# Patient Record
Sex: MALE | Race: WHITE | NOT HISPANIC OR LATINO | Employment: PART TIME | URBAN - METROPOLITAN AREA
[De-identification: names, ages, dates, MRNs, and addresses within clinical notes are randomized per-mention and may not be internally consistent; named-entity substitution may affect disease eponyms.]

---

## 2017-07-24 ENCOUNTER — ALLSCRIPTS OFFICE VISIT (OUTPATIENT)
Dept: OTHER | Facility: OTHER | Age: 52
End: 2017-07-24

## 2017-07-24 DIAGNOSIS — I10 ESSENTIAL (PRIMARY) HYPERTENSION: ICD-10-CM

## 2017-07-24 DIAGNOSIS — R10.9 ABDOMINAL PAIN: ICD-10-CM

## 2017-07-24 DIAGNOSIS — R19.7 DIARRHEA: ICD-10-CM

## 2017-07-24 DIAGNOSIS — K21.9 GASTRO-ESOPHAGEAL REFLUX DISEASE WITHOUT ESOPHAGITIS: ICD-10-CM

## 2017-07-25 ENCOUNTER — APPOINTMENT (OUTPATIENT)
Dept: LAB | Facility: CLINIC | Age: 52
End: 2017-07-25
Payer: COMMERCIAL

## 2017-07-25 DIAGNOSIS — R10.9 ABDOMINAL PAIN: ICD-10-CM

## 2017-07-25 DIAGNOSIS — R19.7 DIARRHEA: ICD-10-CM

## 2017-07-25 DIAGNOSIS — K21.9 GASTRO-ESOPHAGEAL REFLUX DISEASE WITHOUT ESOPHAGITIS: ICD-10-CM

## 2017-07-25 LAB
ALBUMIN SERPL BCP-MCNC: 3.7 G/DL (ref 3.5–5)
ALP SERPL-CCNC: 63 U/L (ref 46–116)
ALT SERPL W P-5'-P-CCNC: 25 U/L (ref 12–78)
ANION GAP SERPL CALCULATED.3IONS-SCNC: 9 MMOL/L (ref 4–13)
AST SERPL W P-5'-P-CCNC: 10 U/L (ref 5–45)
BILIRUB SERPL-MCNC: 0.36 MG/DL (ref 0.2–1)
BUN SERPL-MCNC: 18 MG/DL (ref 5–25)
CALCIUM SERPL-MCNC: 9.3 MG/DL (ref 8.3–10.1)
CHLORIDE SERPL-SCNC: 107 MMOL/L (ref 100–108)
CO2 SERPL-SCNC: 24 MMOL/L (ref 21–32)
CREAT SERPL-MCNC: 0.84 MG/DL (ref 0.6–1.3)
GFR SERPL CREATININE-BSD FRML MDRD: 103 ML/MIN/1.73SQ M
GLUCOSE SERPL-MCNC: 98 MG/DL (ref 65–140)
LIPASE SERPL-CCNC: 1223 U/L (ref 73–393)
POTASSIUM SERPL-SCNC: 3.9 MMOL/L (ref 3.5–5.3)
PROT SERPL-MCNC: 7.7 G/DL (ref 6.4–8.2)
SODIUM SERPL-SCNC: 140 MMOL/L (ref 136–145)

## 2017-07-25 PROCEDURE — 83690 ASSAY OF LIPASE: CPT

## 2017-07-25 PROCEDURE — 87209 SMEAR COMPLEX STAIN: CPT

## 2017-07-25 PROCEDURE — 36415 COLL VENOUS BLD VENIPUNCTURE: CPT

## 2017-07-25 PROCEDURE — 87177 OVA AND PARASITES SMEARS: CPT

## 2017-07-25 PROCEDURE — 80053 COMPREHEN METABOLIC PANEL: CPT

## 2017-07-27 LAB — O+P STL CONC: NORMAL

## 2017-07-28 ENCOUNTER — GENERIC CONVERSION - ENCOUNTER (OUTPATIENT)
Dept: OTHER | Facility: OTHER | Age: 52
End: 2017-07-28

## 2017-07-28 ENCOUNTER — TRANSCRIBE ORDERS (OUTPATIENT)
Dept: ADMINISTRATIVE | Facility: HOSPITAL | Age: 52
End: 2017-07-28

## 2017-07-28 DIAGNOSIS — R74.8 OTHER NONSPECIFIC ABNORMAL SERUM ENZYME LEVELS: Primary | ICD-10-CM

## 2017-07-28 DIAGNOSIS — R93.5 ABNORMAL ABDOMINAL ULTRASOUND: ICD-10-CM

## 2017-08-03 ENCOUNTER — HOSPITAL ENCOUNTER (OUTPATIENT)
Dept: RADIOLOGY | Facility: HOSPITAL | Age: 52
Discharge: HOME | End: 2017-08-03
Payer: COMMERCIAL

## 2017-08-03 DIAGNOSIS — R74.8 OTHER NONSPECIFIC ABNORMAL SERUM ENZYME LEVELS: ICD-10-CM

## 2017-08-03 PROCEDURE — 74160 CT ABDOMEN W/CONTRAST: CPT

## 2017-08-03 RX ADMIN — IOHEXOL 100 ML: 350 INJECTION, SOLUTION INTRAVENOUS at 08:26

## 2017-08-05 VITALS
DIASTOLIC BLOOD PRESSURE: 100 MMHG | SYSTOLIC BLOOD PRESSURE: 172 MMHG | OXYGEN SATURATION: 95 % | RESPIRATION RATE: 18 BRPM | HEART RATE: 82 BPM | TEMPERATURE: 97 F

## 2017-08-06 ENCOUNTER — HOSPITAL ENCOUNTER (EMERGENCY)
Facility: HOSPITAL | Age: 52
Discharge: HOME | End: 2017-08-06
Attending: EMERGENCY MEDICINE
Payer: OTHER MISCELLANEOUS

## 2017-08-06 DIAGNOSIS — S60.511A ABRASION OF RIGHT HAND, INITIAL ENCOUNTER: Primary | ICD-10-CM

## 2017-08-06 PROCEDURE — 90715 TDAP VACCINE 7 YRS/> IM: CPT | Performed by: EMERGENCY MEDICINE

## 2017-08-06 PROCEDURE — 90471 IMMUNIZATION ADMIN: CPT

## 2017-08-06 PROCEDURE — 99282 EMERGENCY DEPT VISIT SF MDM: CPT

## 2017-08-06 RX ADMIN — Medication 1 APPLICATION: at 03:23

## 2017-08-06 RX ADMIN — TETANUS TOXOID, REDUCED DIPHTHERIA TOXOID AND ACELLULAR PERTUSSIS VACCINE, ADSORBED 0.5 ML: 5; 2.5; 8; 8; 2.5 SUSPENSION INTRAMUSCULAR at 03:23

## 2017-08-08 ENCOUNTER — GENERIC CONVERSION - ENCOUNTER (OUTPATIENT)
Dept: OTHER | Facility: OTHER | Age: 52
End: 2017-08-08

## 2017-08-21 ENCOUNTER — ALLSCRIPTS OFFICE VISIT (OUTPATIENT)
Dept: OTHER | Facility: OTHER | Age: 52
End: 2017-08-21

## 2017-08-26 ENCOUNTER — APPOINTMENT (OUTPATIENT)
Dept: LAB | Facility: CLINIC | Age: 52
End: 2017-08-26
Payer: COMMERCIAL

## 2017-08-26 DIAGNOSIS — I10 ESSENTIAL (PRIMARY) HYPERTENSION: ICD-10-CM

## 2017-08-26 LAB
CHOLEST SERPL-MCNC: 240 MG/DL (ref 50–200)
CREAT UR-MCNC: 107 MG/DL
HDLC SERPL-MCNC: 27 MG/DL (ref 40–60)
MICROALBUMIN UR-MCNC: 5.8 MG/L (ref 0–20)
MICROALBUMIN/CREAT 24H UR: 5 MG/G CREATININE (ref 0–30)
TRIGL SERPL-MCNC: 526 MG/DL

## 2017-08-26 PROCEDURE — 82570 ASSAY OF URINE CREATININE: CPT

## 2017-08-26 PROCEDURE — 80061 LIPID PANEL: CPT

## 2017-08-26 PROCEDURE — 36415 COLL VENOUS BLD VENIPUNCTURE: CPT

## 2017-08-26 PROCEDURE — 82043 UR ALBUMIN QUANTITATIVE: CPT

## 2017-08-27 ENCOUNTER — GENERIC CONVERSION - ENCOUNTER (OUTPATIENT)
Dept: OTHER | Facility: OTHER | Age: 52
End: 2017-08-27

## 2017-09-20 ENCOUNTER — HOSPITAL ENCOUNTER (OUTPATIENT)
Dept: RADIOLOGY | Facility: HOSPITAL | Age: 52
Discharge: HOME | End: 2017-09-20
Payer: COMMERCIAL

## 2017-09-20 DIAGNOSIS — R93.5 ABNORMAL ABDOMINAL ULTRASOUND: ICD-10-CM

## 2017-09-20 PROCEDURE — 74181 MRI ABDOMEN W/O CONTRAST: CPT

## 2017-09-25 ENCOUNTER — GENERIC CONVERSION - ENCOUNTER (OUTPATIENT)
Dept: OTHER | Facility: OTHER | Age: 52
End: 2017-09-25

## 2017-10-06 ENCOUNTER — GENERIC CONVERSION - ENCOUNTER (OUTPATIENT)
Dept: OTHER | Facility: OTHER | Age: 52
End: 2017-10-06

## 2017-10-06 DIAGNOSIS — E78.1 PURE HYPERGLYCERIDEMIA: ICD-10-CM

## 2017-10-06 DIAGNOSIS — E78.5 HYPERLIPIDEMIA: ICD-10-CM

## 2018-01-12 VITALS
WEIGHT: 193.25 LBS | RESPIRATION RATE: 18 BRPM | HEART RATE: 80 BPM | TEMPERATURE: 98.6 F | SYSTOLIC BLOOD PRESSURE: 130 MMHG | DIASTOLIC BLOOD PRESSURE: 88 MMHG | HEIGHT: 69 IN | BODY MASS INDEX: 28.62 KG/M2

## 2018-01-12 NOTE — RESULT NOTES
Verified Results  (1) COMPREHENSIVE METABOLIC PANEL [id number] 01:26HL Sameul Expose     Test Name Result Flag Reference   GLUCOSE,RANDM 98 mg/dL     If the patient is fasting, the ADA then defines impaired fasting glucose as > 100 mg/dL and diabetes as > or equal to 123 mg/dL  SODIUM 140 mmol/L  136-145   POTASSIUM 3 9 mmol/L  3 5-5 3   CHLORIDE 107 mmol/L  100-108   CARBON DIOXIDE 24 mmol/L  21-32   ANION GAP (CALC) 9 mmol/L  4-13   BLOOD UREA NITROGEN 18 mg/dL  5-25   CREATININE 0 84 mg/dL  0 60-1 30   Standardized to IDMS reference method   CALCIUM 9 3 mg/dL  8 3-10 1   BILI, TOTAL 0 36 mg/dL  0 20-1 00   ALK PHOSPHATAS 63 U/L     ALT (SGPT) 25 U/L  12-78   AST(SGOT) 10 U/L  5-45   ALBUMIN 3 7 g/dL  3 5-5 0   TOTAL PROTEIN 7 7 g/dL  6 4-8 2   eGFR 103 ml/min/1 73sq [facility] Disease Education Program recommendations are as follows:  GFR calculation is accurate only with a steady state creatinine  Chronic Kidney disease less than 60 ml/min/1 73 sq  meters  Kidney failure less than 15 ml/min/1 73 sq  meters  (1) LIPASE [id number] 09:59AM Sameul Expose     Test Name Result Flag Reference   LIPASE 6202 u/L H      (1) OVA AND PARASITES EXAM [id number] 09:59AM Sameul Expose    Order Number: [id number]     Test Name Result Flag Reference   O&P CONC  EXAM      No ova, cysts, or parasites seen     One negative specimen does not rule out the possibility of a  parasitic infection  These results were obtained using wet preparation(s) and trichrome  stained smear  This test does not include testing for Cryptosporidium  parvum, Cyclospora, or Microsporidia      Performed at:  [address]  [id number]  : Katt Bryan MD, Phone:  [phone]

## 2018-01-12 NOTE — RESULT NOTES
Verified Results  * MRI ABDOMEN WO CONTRAST AND MRCP [id number] 05:18PM Jacquelyn Mccallum     Test Name Result Flag Reference   MRI ABDOMEN WO CONTRAST AND MRCP (Report)     This is a summary report  The complete report is available in the patient's medical record  If you cannot access the medical record, please contact the sending organization for a detailed fax or copy  MRI OF THE ABDOMEN WITHOUT CONTRAST WITH MRCP     INDICATION: Liver lesion     COMPARISON: Previous study from August 3, 2017     TECHNIQUE: The following pulse sequences were obtained on a 1 5 T scanner: 3D MRCP with radial thick slabs and projections  Coronal and axial T2 with TE of 90 and 180 respectively, axial T1-weighted in-and-out-of phase, axial DWI/ADC, axial T2 with fat   saturation, axial FIESTA fat-sat, and axial T1 with fat saturation  3D rendering was performed from the acquisition scanner  Evaluation of the solid abdominal viscera is limited without intravenous contrast      FINDINGS:     BILIARY/PANCREATIC DUCTS:    No intra-hepatic biliary ductal dilatation  Common bile duct is normal in caliber  No evidence of bile duct stricture or choledocholithiasis  The distal most portion/periampullary portion of the common bile duct is not well evaluated due to underdistention during the evaluation     LIVER: Demonstrates fatty infiltration of the liver   There is a lesion in the segment 3 which is on the CT of August 3, 2017 and is most compatible with hemangioma T2 hyperintense which is demonstrates progressive fill-in      GALLBLADDER: Normal      PANCREAS: The evaluation of the pancreas is limited without the contrast however there is no discrete hypointense focus or lesion seen within the pancreas   There is no dilation of the pancreatic duct  There is no pancreatic ductal atrophy seen     ADRENAL GLANDS: Normal      SPLEEN: Normal      KIDNEYS: Normal      ABDOMINAL CAVITY: No lymphadenopathy or mass  No Ascites  BOWEL: Unremarkable MRI appearance  OSSEOUS STRUCTURES: No osseous destruction  VASCULAR STRUCTURES: No aneurysm  ABDOMINAL WALL: Normal      LOWER CHEST: Unremarkable MRI appearance  IMPRESSION:     No biliary dilation seen  A T2 hyperintense lesion within the segment 3 of the liver, measuring about 2 3 x 2 1 cm which demonstrates progressive fill-in on the CT images of August 3, 2017, demonstrates imaging features most compatible with the    hemangioma  Evaluation for pancreatic lesion is limited due to lack of contrast however on the T1 fat-saturated images there are no suspicious and worrisome findings of a focal lesion, pancreatic ductal dilation or atrophy in the pancreas       Small area of hypodensity noted within the pancreatic head on the CT of August 17 can be evaluated with follow-up at pancreatic imaging with CT       ##sigslh##sigslh     ##fuslh3##fuslh3        Workstation performed: [id number]     Signed by:   Dena Lezama MD   9/21/17

## 2018-01-12 NOTE — RESULT NOTES
Verified Results  CT ABDOMEN W CONTRAST [id number] 07:41AM Trenton Bautista     Test Name Result Flag Reference   CT ABDOMEN W CONTRAST (Report)     CT ABDOMEN WITH IV CONTRAST     INDICATION: Abnormal elevation of serum lipase  COMPARISON: None  TECHNIQUE: CT examination of the abdomen was performed  Reformatted images were created in axial, sagittal, and coronal planes  Radiation dose length product (DLP) for this visit: 816 18 mGy-cm   This examination, like all CT scans performed in the [facility], was performed utilizing techniques to minimize radiation dose exposure, including the use of iterative   reconstruction and automated exposure control  IV Contrast: 100 mL of iohexol (OMNIPAQUE) 350   Enteric Contrast: Enteric contrast was administered  FINDINGS:     ABDOME     LOWER CHEST: No significant abnormality in the lung bases  LIVER/BILIARY TREE: The liver is normal size  There is a lesion in the posterior aspect of the lateral segment left hepatic lobe measuring up to 2 4 cm which demonstrates peripheral nodular enhancement, and is likely a hemangioma  Delayed imaging was    not obtained  GALLBLADDER: No calcified gallstones  No pericholecystic inflammatory change  SPLEEN: Unremarkable  PANCREAS: Very slightly heterogeneously diminished attenuation/enhancement in the pancreatic head/pancreatic neck image 4/34  No focal mass  No obvious collection or pseudocyst  Pancreatic duct appears normal caliber  ADRENAL GLANDS: 0 8 cm nodule lateral limb right adrenal    1 4 cm nodule lateral limb right adrenal    Left adrenal unremarkable  KIDNEYS/URETERS: Unremarkable  No hydronephrosis  VISUALIZED STOMACH AND BOWEL: Mild wall thickening affecting proximal jejunum, likely related to peristalsis  The remainder of the visible small bowel is unremarkable  Visualized large bowel is unremarkable demonstrating diverticulosis    Visualized    appendix is normal      ABDOMINAL CAVITY: No ascites or free intraperitoneal air  No lymphadenopathy  VESSELS: Unremarkable for patient's age  ABDOMINAL WALL: Small Fat-containing umbilical hernia  OSSEOUS STRUCTURES: No acute fracture or destructive osseous lesion  IMPRESSION:   2 4 cm liver lesion which is most likely a hemangioma but is incompletely evaluated on this exam which was tailored for evaluation of the pancreas  There are 2 right-sided renal nodules which could not be further characterized given the IV contrast enhancement on this exam      Very slightly heterogeneously diminished attenuation/enhancement in the pancreatic head/pancreatic neck which may represent a small area of pancreatitis  A discrete mass is not visualized however underlying mass could not be definitively excluded  No    obvious collection, pseudocyst, or abscess  Pancreatic duct appears normal caliber  Mild proximal jejunal wall thickening likely related to peristalsis  Given the above abnormalities: Recommend follow-up MRI pancreas with MRCP to document resolution of the pancreatic abnormality (the timing of which should be based on the severity of the patient's symptoms - if the patient's condition will allow a delay    of several weeks this would be optimal)  This exam should be able to also evaluate the presumed liver hemangioma and the adrenal nodules  ##sigslh##sigslh       Workstation performed: [id number]     Signed by:    Demetrius Mares DO   8/8/17

## 2018-01-13 VITALS
BODY MASS INDEX: 27.55 KG/M2 | RESPIRATION RATE: 18 BRPM | HEIGHT: 69 IN | HEART RATE: 90 BPM | SYSTOLIC BLOOD PRESSURE: 130 MMHG | TEMPERATURE: 96.3 F | WEIGHT: 186 LBS | DIASTOLIC BLOOD PRESSURE: 82 MMHG

## 2018-01-13 NOTE — RESULT NOTES
Verified Results  (1) LIPID PANEL, FASTING [id number] 07:16AM Bonnie Velazquez, [address] [facility] Order Number: [id number]     Test Name Result Flag Reference   CHOLESTEROL 240 mg/dL H    HDL,DIRECT 27 mg/dL L 40-60   Specimen collection should occur prior to Metamizole administration due to the potential for falsley depressed results  LDL CHOLESTEROL CALCULATED (Report)  0-100   Calculated LDL invalid, triglycerides >400 mg/dl      Triglyceride:      Normal ??? ??? ??? ??? ??? ??? ??? <150 mg/dl   ??? ??? ???Borderline High ??? ??? 150-199 mg/dl   ??? ??? ? ?? High ??? ??? ??? ??? ??? ??? ??? 200-499 mg/dl   ??? ??? ? ??Very High ??? ??? ??? ??? ??? >499 mg/dl      Cholesterol:     Desirable ??? ??? ??? ??? <200 mg/dl   ??? ??? Borderline High ??? 200-239 mg/dl   ??? ??? High ??? ??? ??? ??? ??? ??? >239 mg/dl      HDL Cholesterol:     High ??? ???>59 mg/dL   ??? ??? Low ??? ??? <41 mg/dL   Calculated LDL invalid, triglycerides >400 mg/dl      Triglyceride:        Normal ??? ??? ??? ??? ??? ??? ??? <150 mg/dl   ??? ??? ???Borderline High ??? ??? 150-199 mg/dl   ??? ??? ? ?? High ??? ??? ??? ??? ??? ??? ??? 200-499 mg/dl   ??? ??? ? ??Very High ??? ??? ??? ??? ??? >499 mg/dl      Cholesterol:       Desirable ??? ??? ??? ??? <200 mg/dl   ??? ??? Borderline High ??? 200-239 mg/dl   ??? ??? High ??? ??? ??? ??? ??? ??? >239 mg/dl      HDL Cholesterol:       High ??? ???>59 mg/dL   ??? ??? Low ??? ??? <41 mg/dL   TRIGLYCERIDES 526 mg/dL H <=150   Specimen collection should occur prior to N-Acetylcysteine or Metamizole administration due to the potential for falsely depressed results       (1) MICROALBUMIN CREATININE RATIO, RANDOM URINE [id number] 07:16AM Aminta Pacheco    Order Number: [id number]     Test Name Result Flag Reference   MICROALBUMIN/ CREAT R 5 mg/g creatinine  0-30   MICROALBUMIN,URINE 5 8 mg/L  0 0-20 0   CREATININE URINE 107 0 mg/dL

## 2018-01-13 NOTE — MISCELLANEOUS
Message  Return to work or school:   Allen Mccallum is under my professional care  He was seen in my office on 7/24/17 and phone conversation on 7/28/17  He is able to return to work on  7/29/17       Dr Aaron Villasenor        Signatures   Electronically signed by : RORY Giron ; Jul 28 2017  3:34PM EST                       (Author)

## 2018-01-22 VITALS
TEMPERATURE: 96.5 F | RESPIRATION RATE: 16 BRPM | BODY MASS INDEX: 27.75 KG/M2 | SYSTOLIC BLOOD PRESSURE: 146 MMHG | HEIGHT: 69 IN | DIASTOLIC BLOOD PRESSURE: 100 MMHG | WEIGHT: 187.38 LBS | HEART RATE: 76 BPM

## 2020-02-11 ENCOUNTER — OFFICE VISIT (OUTPATIENT)
Dept: OBGYN CLINIC | Facility: HOSPITAL | Age: 55
End: 2020-02-11
Payer: OTHER MISCELLANEOUS

## 2020-02-11 ENCOUNTER — HOSPITAL ENCOUNTER (OUTPATIENT)
Dept: RADIOLOGY | Facility: HOSPITAL | Age: 55
Discharge: HOME | End: 2020-02-11
Payer: OTHER MISCELLANEOUS

## 2020-02-11 VITALS
SYSTOLIC BLOOD PRESSURE: 151 MMHG | BODY MASS INDEX: 28.14 KG/M2 | WEIGHT: 190 LBS | DIASTOLIC BLOOD PRESSURE: 111 MMHG | HEART RATE: 84 BPM | HEIGHT: 69 IN

## 2020-02-11 DIAGNOSIS — M25.511 ACUTE PAIN OF RIGHT SHOULDER: Primary | ICD-10-CM

## 2020-02-11 DIAGNOSIS — S40.011A CONTUSION OF RIGHT SCAPULAR REGION, INITIAL ENCOUNTER: ICD-10-CM

## 2020-02-11 DIAGNOSIS — S40.011A CONTUSION OF RIGHT SHOULDER, INITIAL ENCOUNTER: ICD-10-CM

## 2020-02-11 DIAGNOSIS — M25.511 ACUTE PAIN OF RIGHT SHOULDER: ICD-10-CM

## 2020-02-11 PROCEDURE — 73030 X-RAY EXAM OF SHOULDER: CPT

## 2020-02-11 PROCEDURE — 99203 OFFICE O/P NEW LOW 30 MIN: CPT | Performed by: PHYSICIAN ASSISTANT
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
## 2020-02-20 ENCOUNTER — EVALUATION (OUTPATIENT)
Dept: PHYSICAL THERAPY | Facility: REHABILITATION | Age: 55
End: 2020-02-20
Payer: OTHER MISCELLANEOUS

## 2020-02-20 DIAGNOSIS — S40.011A CONTUSION OF RIGHT SCAPULAR REGION, INITIAL ENCOUNTER: ICD-10-CM

## 2020-02-20 DIAGNOSIS — S40.011A CONTUSION OF RIGHT SHOULDER, INITIAL ENCOUNTER: Primary | ICD-10-CM

## 2020-02-20 PROCEDURE — 97161 PT EVAL LOW COMPLEX 20 MIN: CPT | Performed by: PHYSICAL THERAPIST
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
## 2020-03-13 VITALS
DIASTOLIC BLOOD PRESSURE: 101 MMHG | HEART RATE: 80 BPM | HEIGHT: 69 IN | BODY MASS INDEX: 28.14 KG/M2 | SYSTOLIC BLOOD PRESSURE: 153 MMHG | WEIGHT: 190 LBS

## 2020-03-13 DIAGNOSIS — S40.011D CONTUSION OF RIGHT SHOULDER, SUBSEQUENT ENCOUNTER: Primary | ICD-10-CM

## 2020-03-13 PROBLEM — S40.011A CONTUSION OF RIGHT SHOULDER: Status: ACTIVE | Noted: 2020-03-13

## 2020-03-13 PROCEDURE — 99213 OFFICE O/P EST LOW 20 MIN: CPT | Performed by: ORTHOPAEDIC SURGERY
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
## 2020-03-13 NOTE — PATIENT INSTRUCTIONS
Plan:  The bone bruise with secondary muscle spasm in his  right shoulder is definitely decreased  He did not go for therapy as they  reportedly told him  this could not be treated with therapy  This is not totally true because the bone will heal without treatment but the   secondary muscle spasm and soreness that he has would respond to gentle stretching exercise which he was shown today to do st home  He can put ice on the area for 15 minutes 4 times a day if needed and take Advil, Aleve, or Tylenol if needed for pain  More importantly his blood pressure is significantly elevated again, as it was on his original exam, and he was told about it again  He refuses to get it investigated because of no insurance and because he does not want to be be beholden  to any medicine for the rest of his life  I tried to explain that the hypertension is the silent killer and causes stroke and heart attack without notice and is easily treated with simple medicine  We have programs for people without insurance that would make his care  reasonable and we do not turn antibody away for lack of insurance  He still refused and I can't do anything more than give him an educated medical opinion  He should do the home exercise for his  Shoulder   I have discharged him from our care